# Patient Record
Sex: FEMALE | Race: WHITE
[De-identification: names, ages, dates, MRNs, and addresses within clinical notes are randomized per-mention and may not be internally consistent; named-entity substitution may affect disease eponyms.]

---

## 2023-07-26 ENCOUNTER — HOSPITAL ENCOUNTER (INPATIENT)
Dept: HOSPITAL 97 - ER | Age: 73
LOS: 2 days | Discharge: HOME | DRG: 287 | End: 2023-07-28
Attending: HOSPITALIST | Admitting: HOSPITALIST
Payer: MEDICARE

## 2023-07-26 VITALS — BODY MASS INDEX: 39.6 KG/M2

## 2023-07-26 DIAGNOSIS — Z79.899: ICD-10-CM

## 2023-07-26 DIAGNOSIS — I48.19: Primary | ICD-10-CM

## 2023-07-26 DIAGNOSIS — E87.6: ICD-10-CM

## 2023-07-26 DIAGNOSIS — Z85.3: ICD-10-CM

## 2023-07-26 DIAGNOSIS — I24.8: ICD-10-CM

## 2023-07-26 DIAGNOSIS — Z88.5: ICD-10-CM

## 2023-07-26 DIAGNOSIS — E66.9: ICD-10-CM

## 2023-07-26 LAB
ALBUMIN SERPL BCP-MCNC: 3.8 G/DL (ref 3.4–5)
ALP SERPL-CCNC: 50 U/L (ref 45–117)
ALT SERPL W P-5'-P-CCNC: 42 U/L (ref 13–56)
AST SERPL W P-5'-P-CCNC: 19 U/L (ref 15–37)
BILIRUB INDIRECT SERPL-MCNC: 0.4 MG/DL (ref 0.2–0.8)
BUN BLD-MCNC: 19 MG/DL (ref 7–18)
GLUCOSE SERPLBLD-MCNC: 124 MG/DL (ref 74–106)
HCT VFR BLD CALC: 44.4 % (ref 36–45)
INR BLD: 1.03
LYMPHOCYTES # SPEC AUTO: 2.9 K/UL (ref 0.7–4.9)
MAGNESIUM SERPL-MCNC: 2.6 MG/DL (ref 1.6–2.4)
MCV RBC: 94.6 FL (ref 80–100)
PMV BLD: 7 FL (ref 7.6–11.3)
POTASSIUM SERPL-SCNC: 3.1 MEQ/L (ref 3.5–5.1)
RBC # BLD: 4.69 M/UL (ref 3.86–4.86)
TROPONIN I SERPL HS-MCNC: 538.1 PG/ML (ref ?–58.9)
TSH SERPL DL<=0.05 MIU/L-ACNC: 2.68 UIU/ML (ref 0.36–3.74)

## 2023-07-26 PROCEDURE — 70450 CT HEAD/BRAIN W/O DYE: CPT

## 2023-07-26 PROCEDURE — 85610 PROTHROMBIN TIME: CPT

## 2023-07-26 PROCEDURE — 70551 MRI BRAIN STEM W/O DYE: CPT

## 2023-07-26 PROCEDURE — 80076 HEPATIC FUNCTION PANEL: CPT

## 2023-07-26 PROCEDURE — 76937 US GUIDE VASCULAR ACCESS: CPT

## 2023-07-26 PROCEDURE — 82947 ASSAY GLUCOSE BLOOD QUANT: CPT

## 2023-07-26 PROCEDURE — 84484 ASSAY OF TROPONIN QUANT: CPT

## 2023-07-26 PROCEDURE — 84146 ASSAY OF PROLACTIN: CPT

## 2023-07-26 PROCEDURE — 99284 EMERGENCY DEPT VISIT MOD MDM: CPT

## 2023-07-26 PROCEDURE — 36415 COLL VENOUS BLD VENIPUNCTURE: CPT

## 2023-07-26 PROCEDURE — 80048 BASIC METABOLIC PNL TOTAL CA: CPT

## 2023-07-26 PROCEDURE — 84100 ASSAY OF PHOSPHORUS: CPT

## 2023-07-26 PROCEDURE — 71045 X-RAY EXAM CHEST 1 VIEW: CPT

## 2023-07-26 PROCEDURE — 93005 ELECTROCARDIOGRAM TRACING: CPT

## 2023-07-26 PROCEDURE — 81003 URINALYSIS AUTO W/O SCOPE: CPT

## 2023-07-26 PROCEDURE — 83880 ASSAY OF NATRIURETIC PEPTIDE: CPT

## 2023-07-26 PROCEDURE — 84443 ASSAY THYROID STIM HORMONE: CPT

## 2023-07-26 PROCEDURE — 83001 ASSAY OF GONADOTROPIN (FSH): CPT

## 2023-07-26 PROCEDURE — 83735 ASSAY OF MAGNESIUM: CPT

## 2023-07-26 PROCEDURE — 85025 COMPLETE CBC W/AUTO DIFF WBC: CPT

## 2023-07-26 PROCEDURE — 96375 TX/PRO/DX INJ NEW DRUG ADDON: CPT

## 2023-07-26 PROCEDURE — 96374 THER/PROPH/DIAG INJ IV PUSH: CPT

## 2023-07-26 PROCEDURE — 96372 THER/PROPH/DIAG INJ SC/IM: CPT

## 2023-07-26 PROCEDURE — 93458 L HRT ARTERY/VENTRICLE ANGIO: CPT

## 2023-07-26 RX ADMIN — Medication SCH ML: at 22:17

## 2023-07-26 RX ADMIN — METOPROLOL TARTRATE SCH MG: 25 TABLET ORAL at 22:16

## 2023-07-26 NOTE — RAD REPORT
EXAM DESCRIPTION:  CT - Head Brain Wo Cont - 7/26/2023 8:20 pm

 

CLINICAL HISTORY:  Dizziness;Headache

 

COMPARISON:  No comparisons

 

TECHNIQUE:  All CT scans are performed using dose optimization technique as appropriate and may inclu
de automated exposure control or mA/KV adjustment according to patient size.

 

FINDINGS:  No intracranial hemorrhage, hydrocephalus or extra-axial fluid collection.No areas of brai
n edema or evidence of midline shift.

 

The paranasal sinuses and mastoids are clear. The calvarium is intact.

 

IMPRESSION:  No acute intracranial abnormality.

## 2023-07-26 NOTE — RAD REPORT
EXAM DESCRIPTION:  RAD - Chest Single View - 7/26/2023 6:14 pm

 

CLINICAL HISTORY:  PALPITATIONS

Chest pain.

 

COMPARISON:  No comparisons

 

FINDINGS:  Portable technique limits examination quality.

 

Mild interstitial prominence. The heart is normal in size. No displaced fractures.

 

IMPRESSION:  Mild CHF is possible.

## 2023-07-26 NOTE — XMS REPORT
Clinical Summary

                            Created on:2023



Patient:Edgard Yost

Sex:Female

:1950

External Reference #:[id number]





Demographics







                          Address                   [address] [place] [address]

 

                          Home Phone                [phone]

 

                          Mobile Phone              [phone]

 

                          Email Address             [email or web address]

 

                          Preferred Language        English

 

                          Marital Status            

 

                          Buddhist Affiliation     Unknown

 

                          Race                      White

 

                          Ethnic Group              Not  or 









Author







                          Organization              [facility]

[facility]

 

                          Address                   [address] [place] [address]









Support







                Name            Relationship    Address         Phone

 

                Chan Yost Unavailable     Unavailable     [phone]









Care Team Providers







                    Name                Role                Phone

 

                    Myrtle Barney MD   Primary Care Provider [phone]

 

                    Alessio Mercedes MD Unavailable         [phone]

1









Allergies







             Active Allergy Reactions    Severity     Noted Date   Comments

 

             Codeine      Other (See Comments)              2020   Halluci

nations







Medications







          Medication Sig       Dispensed Refills   Start Date End Date  Status

 

          aspirin 81 mg chewable Chew 81 mg           0                         

    Active



          tablet    daily.                                            

 

          hydroCHLOROthiazide Take 1 tablet           0         2019      

     Active



          (HYDRODIURIL) 25 mg tablet by mouth                                   

       



                    daily.                                            

 

          potassium citrate Take by             0                             Ac

tive



          (UROCIT-K) 10 mEq SR tablet mouth.                                    

        

 

          d-mannose powd Take 1,000 mg           0                             A

ctive



                    by mouth.                                         

 

          cholecalciferol, vitamin Take 5,000           0                       

      Active



          D3, (VITAMIN D3) 5,000 Units by                                       

   



          units tab tablet mouth daily.                                         







Active Problems







                          Problem                   Noted Date

 

                          Osteopenia                10/09/2020

 

                          Primary hyperparathyroidism 2020









                                        Overview: Formatting of this note might 

be different from the original.



                                        Added automatically from request for minesh

jules [id number]









                          Hypertension              2020

 

                          Personal history of breast cancer 2020

 

                          Hyperparathyroidism       2020







Surgical History







                Surgery         Date            Site/Laterality Comments

 

                [place]              3/11/2020       Neck/N/A        Procedure: PARAT

HYROIDECTOMY OR



                PARATHYROIDECTOMY/EXPLORATI                                 EXPL

ORATION OF PARATHYROID(S) -



                ON PARATHYROIDS                                 minimally invasi

ve



                                                                parathyroidectom

y.Removal of



                                                                Left sided infer

ior Type D



                                                                Parathyroid glan

xiomara; Surgeon:



                                                                ROBERT Rod; Location:



                                                                [facility]; Service

: SURG ONC -



                                                                ENDOCRINE







Family History







                Medical History Relation        Name            Comments

 

                Parkinsonism    Brother                         

 

                Acute myelogenous leukemia Father                          

 

                Myelodysplastic syndrome Father                          progres

sed to AML

 

                Breast cancer   Maternal Cousin 2                 

 

                Melanoma        Maternal Cousin 3                 metastatic

 

                Colon cancer    Maternal Grandmother                 

 

                -Unknown cancer Paternal Aunt 1                 abdominal cancer

 

                Breast cancer   Paternal Cousin                 









                Relation        Name            Status          Comments

 

                Brother                         Alive           No history of ca

ncer

 

                Daughter 1                      Alive           

 

                Daughter 2                      Alive           

 

                Father                           (Age 75) 

 

                Maternal Aunt 1 n=3                     No history of ca

ncer

 

                Maternal Aunt 2                 Alive           No history of ca

ncer

 

                Maternal Cousin 1 n=all mat 1st   Alive           



                                cousins                         

 

                Maternal Cousin 2                 Alive           

 

                Maternal Cousin 3                  (Age 20-29) 

 

                Maternal Grandfather                  (Age 80-89) 

 

                Maternal Grandmother                  (Age 75-79) d: col

on cancer

 

                Maternal Uncle  n=3                     No history of ca

ncer

 

                Mother                          Alive           no history of ca

ncer.CHAYO



                                                                BSO due to endom

etriosis



                                                                late 40's

 

                Niece/Nephew    n=2             Alive           No cancer

 

                Paternal Aunt 1                 Alive           

 

                Paternal Aunt 2                 Alive           No history of ca

ncer

 

                Paternal Cousin                 Alive           

 

                Paternal Grandfather                         No history 

of cancer

 

                Paternal Grandmother                  (Age 94) No histor

y of cancer

 

                Paternal Uncle 1                         No history of c

ancer

 

                Paternal Uncle 2                         No history of c

ancer

 

                Paternal Uncle 3                         No history of c

ancer







Social History







             Tobacco Use  Types        Packs/Day    Years Used   Date

 

             Smoking Tobacco: Never Assessed                                    

    









                          Sex Assigned at Birth     Date Recorded

 

                          Not on file               









                    Job Start Date      Occupation          Industry

 

                    Not on file         Not on file         Not on file







Obstetrics History





Last Filed Vital Signs

Not on file



Plan of Treatment







                Health Maintenance Due Date        Last Done       Comments

 

                COVID-19 Vaccination (3 - Moderna series) 2021, 2021 







Results

Not on fileafter 2022



Insurance







          Payer     Benefit Plan / Subscriber ID Effective Phone     Address   T

ype



                    Group               Dates                         

 

          MEDICARE  MEDICARE  [id number]    2021-Prese [phone] [address]    Medic

are



           ADVANTAGE  ADVANTAGE             nt         [id number]        [id number]



                                        



          GENERIC   GENERIC                                 [place] 



                                                            [id number]     









           Guarantor Name Account Type Relation to Date of    Phone      Billing

 Address



                                 Patient    Birth                 

 

           Edgard Yost Personal/Famil Self       1950 [phone] [address]







                      y                                (Home)     [place]



                                                                  [id number]

 

           Edgard Yost Personal/Famil Self       1950 [phone] [address]







                      y                                (Home)     [place]



                                                                  [address]

 

           Edgard Yost Personal/Famil Self       1950 [phone] [address]







                      y                                (Home)     [place]



                                                                  [address]







Advance Directives







                Code Status     Date Activated  Date Inactivated Comments

 

                Full Code       3/11/2020 1:30 PM 3/11/2020 9:12 PM 







Care Teams







             Team Member  Relationship Specialty    Start Date   End Date

 

                                        Myrtle Barney MD



                PCP - General   Endocrine Surgery [address]         



                                        [address] [place] [address]



                                                                



                                        [phone] (Work)



                                                                



             [phone] (Fax)                                        

 

             Alessio Mercedes, PCP - External Referring Internal Medicine 

[address]      



                                        MD



                                                                



                                        [address] [place] [address]



                                                                



                                        [phone] (Work)



                                                                



             [phone] (Fax)

## 2023-07-26 NOTE — ER
Nurse's Notes                                                                                     

 [facility]                                                                 

Name: Edgard Yost                                                                              

Age: 73 yrs                                                                                       

Sex: Female                                                                                       

: 1950                                                                                   

MRN: [id number]                                                                                   

Arrival Date: 2023                                                                          

Time: 17:23                                                                                       

Account#: [id number]                                                                            

Bed 3                                                                                             

Private MD:                                                                                       

Diagnosis: Persistent atrial fibrillation-WITH RVR, NEW ONSET;Palpitations;Hypokalemia;Obesity,   

  unspecified;Non ST elevation MI-ELEVATED TROPONIN                                               

                                                                                                  

Presentation:                                                                                     

                                                                                             

17:35 Chief complaint: Patient states: currently taking Clindamycin post root canal and       aa5 

      states "my heart rate has been over 140 for hours today". Pt also reports heart             

      palpitations and dizziness. Coronavirus screen: At this time, the client does not           

      indicate any symptoms associated with coronavirus-19. Ebola Screen: Patient denies          

      travel to an Ebola-affected area in the 21 days before illness onset. Initial Sepsis        

      Screen: Does the patient meet any 2 criteria? HR > 90 bpm. Does the patient have a          

      suspected source of infection? Yes:. Risk Assessment: Do you want to hurt yourself or       

      someone else? Patient reports no desire to harm self or others. Onset of symptoms was       

      2023.                                                                              

17:35 Acuity: LEYLA 2                                                                           aa5 

17:35 Method Of Arrival: Wheelchair                                                           aa5 

                                                                                                  

Historical:                                                                                       

- Allergies:                                                                                      

17:32 Codeine;                                                                                aa5 

- Home Meds:                                                                                      

17:32 Hydrochlorothiazide Oral [Active];                                                      aa5 

- PMHx:                                                                                           

17:32 Kidney stone; Breast Cancer; Muscle sarcoma;                                            aa5 

- PSHx:                                                                                           

17:32 back;  section;                                                                 aa5 

                                                                                                  

- Immunization history:: Adult Immunizations unknown.                                             

- Social history:: Smoking status: Patient denies any tobacco usage or history of.                

- Family history:: not pertinent.                                                                 

                                                                                                  

                                                                                                  

Screenin:15 [facility] ED Fall Risk Assessment (Adult) History of falling in the last 3 months,       ko1 

      including since admission No falls in past 3 months (0 pts) Confusion or Disorientation     

      No (0 pts) Intoxicated or Sedated No (0 pts) Impaired Gait No (0 pts) Mobility Assist       

      Device Used No (0 pt) Altered Elimination No (0 pt) Score/Fall Risk Level 0 - 2 = Low       

      Risk Oriented to surroundings, Maintained a safe environment, Educated pt \T\ family on     

      fall prevention, incl call for assistance when getting out of bed, Assessed \T\             

      reinforced patient's understanding of fall precautions, Provided non-skid footwear,         

      Hourly rounding (assess needs \T\ fall precautionary measures) done, Used ambulatory aids   

      as needed (educated on \T\ assisted with), Used gait belt as appropriate. Abuse screen:     

      Denies threats or abuse. Denies injuries from another. Nutritional screening: No            

      deficits noted. Tuberculosis screening: No symptoms or risk factors identified.             

                                                                                                  

Assessment:                                                                                       

18:00 General: Appears in no apparent distress. uncomfortable, Behavior is calm, cooperative, ko1 

      appropriate for age. Pain: Denies pain. Neuro: No deficits noted. Cardiovascular:           

      Reports lightheadedness, palpitations, Patient's skin is warm and dry. Pulses are all       

      present. Rhythm is atrial fibrillation with rapid ventricular response. Respiratory: No     

      deficits noted. GI: No deficits noted. : No deficits noted. EENT: No deficits noted.      

      Derm: No deficits noted. Musculoskeletal: No deficits noted.                                

                                                                                                  

Vital Signs:                                                                                      

17:35  / 81; Pulse 120; Resp 18 S; Temp 98.4(TE); Pulse Ox 98% on R/A; Weight 108.86 kg aa5 

      (R); Height 5 ft. 7 in. (R);                                                                

18:15 BP 99 / 77; Pulse 149; Resp 18; Pulse Ox 95% ;                                          ko1 

18:20  / 76; Pulse 103; Resp 16; Pulse Ox 97% ;                                         ko1 

18:27  / 64; Pulse 111; Resp 18; Pulse Ox 95% ;                                         ko1 

18:30  / 76; Pulse 108; Resp 16; Pulse Ox 94% ;                                         ko1 

17:35 Body Mass Index 37.59 (108.86 kg, 170.18 cm)                                            aa5 

                                                                                                  

ED Course:                                                                                        

17:26 Patient arrived in ED.                                                                  im  

17:32 Arm band placed on.                                                                     aa5 

17:37 Triage completed.                                                                       aa5 

17:40 Jensen Ruiz MD is Attending Physician.                                             [facility] 

17:40 Patient has correct armband on for positive identification. Placed in gown. Bed in low  aa5 

      position. Call light in reach. Side rails up X2. Client placed on continuous cardiac        

      and pulse oximetry monitoring. NIBP monitoring applied.                                     

17:48 EKG done, by ED staff, reviewed by Jensen Ruiz MD.                                   aa5 

17:56 Martha Gan, RN is Primary Nurse.                                                     ko1 

18:15 Provided Education on: Afib/meds/labs. Door closed. Noise minimized.                    ko1 

18:15 Inserted saline lock: 20 gauge in left antecubital area, using aseptic technique. Blood ko1 

      collected.                                                                                  

18:16 XRAY Chest (1 view) In Process Unspecified.                                             ED[facility]

18:25 CBC with Diff Sent.                                                                     ko1 

18:25 LFT's Sent.                                                                             ko1 

18:25 Magnesium Sent.                                                                         ko1 

18:25 NT PRO-BNP Sent.                                                                        ko1 

18:25 PT-INR Sent.                                                                            ko1 

18:25 Troponin HS Sent.                                                                       ko1 

18:25 Basic Metabolic Panel Sent.                                                             ko1 

18:25 TSH Sent.                                                                               ko1 

18:25 Urinalysis w/ reflexes Sent.                                                            ko1 

18:49 Cindy Hanna MD is Hospitalizing Provider.                                           jeny 

                                                                                                  

Administered Medications:                                                                         

18:24 Drug: Metoprolol PO 50 mg Route: PO;                                                    ko1 

18:24 Drug: NS 0.9% IV 1000 ml Route: IV; Rate: 1 bolus; Site: left antecubital;              ko1 

18:24 Drug: Magnesium Sulfate IVPB 1 grams Route: IVPB; Infused Over: 1 hrs; Site: left       ko1 

      antecubital;                                                                                

18:25 Drug: Metoprolol IVP 5 mg Route: IVP; Site: left antecubital;                           ko1 

18:47 Drug: Famotidine IVP 20 mg Route: IVP; Site: left antecubital;                          ko1 

18:47 Drug: Rocephin IV 1 grams Route: IV; Rate: per protocol; Site: left antecubital;        ko1 

18:56 Drug: Metoprolol IVP 5 mg Route: IVP; Site: left antecubital;                           ko1 

19:09 Drug: Acetaminophen PO 1000 mg Route: PO;                                               ko1 

19:14 Drug: Metoprolol IVP 5 mg Route: IVP; Site: left antecubital;                           ko1 

19:14 Drug: Enoxaparin Sub-Q 1 mg/kg Route: Sub-Q; Site: abdomen;                             ko1 

19:20 Drug: Aspirin PO Chewable Tablet 324 mg Route: PO;                                      as6 

20:02 Drug: Potassium PO Effervescent Tablet 50 mEq Route: PO;                                jb4 

                                                                                                  

                                                                                                  

Outcome:                                                                                          

18:50 Decision to Hospitalize by Provider.                                                    [facility] 

20:23 Patient left the ED.                                                                    jb4 

                                                                                                  

Signatures:                                                                                       

Dispatcher [employer]                           EDJensen Gunn, MD                     MD   jeny                                                  

Silverman, Nury, RN                     RN   aa5                                                  

Jeb Joshi, RN                       RN   jb4                                                  

Kt Mahan, DANIKA                      RN   as6                                                  

Martha Gan, DANIKA                       RN   ko1                                                  

Celi Melendez                                                   

                                                                                                  

Corrections: (The following items were deleted from the chart)                                    

17:37 17:35  / 81; Pulse 73bpm; Resp 18bpm; Spontaneous; Pulse Ox 98% RA; Temp 98.4F    aa5 

      Temporal; 108.86 kg Reported; Height 5 ft. 7 in. Reported; BMI: 37.5; aa5                   

                                                                                                  

**************************************************************************************************

## 2023-07-26 NOTE — EDPHYS
Physician Documentation                                                                           

 [facility]                                                                 

Name: Edgard Yost                                                                              

Age: 73 yrs                                                                                       

Sex: Female                                                                                       

: 1950                                                                                   

MRN: [id number]                                                                                   

Arrival Date: 2023                                                                          

Time: 17:23                                                                                       

Account#: [id number]                                                                            

Bed 3                                                                                             

Private MD:                                                                                       

ED Physician Jensen Ruiz                                                                      

HPI:                                                                                              

                                                                                             

18:44 This 73 yrs old  Female presents to ER via Wheelchair with complaints of heart jeny 

      rate issues.                                                                                

18:44 The patient presents with a history of irregular heart beat, heart racing. Context: The jeny 

      symptoms occur at rest. Onset: The symptoms/episode began/occurred just prior to            

      arrival, this morning. Duration: The patient or guardian reports a single episode, that     

      is still ongoing. Modifying factors: The symptoms are aggravated by nothing. The            

      symptoms are alleviated by nothing. FAST HR, NO HX. Associated signs and symptoms:          

      Pertinent positives: lightheadedness. Severity of symptoms: At their worst the symptoms     

      were mild moderate in the emergency department the symptoms are unchanged. The patient      

      has not experienced similar symptoms in the past.                                           

                                                                                                  

Historical:                                                                                       

- Allergies:                                                                                      

17:32 Codeine;                                                                                aa5 

- Home Meds:                                                                                      

17:32 Hydrochlorothiazide Oral [Active];                                                      aa5 

- PMHx:                                                                                           

17:32 Kidney stone; Breast Cancer; Muscle sarcoma;                                            aa5 

- PSHx:                                                                                           

17:32 back;  section;                                                                 aa5 

                                                                                                  

- Immunization history:: Adult Immunizations unknown.                                             

- Social history:: Smoking status: Patient denies any tobacco usage or history of.                

- Family history:: not pertinent.                                                                 

                                                                                                  

                                                                                                  

ROS:                                                                                              

18:44 Constitutional: Negative for fever, chills, and weight loss, Eyes: Negative for injury, jeny 

      pain, redness, and discharge, ENT: Negative for injury, pain, and discharge, Neck:          

      Negative for injury, pain, and swelling, Respiratory: Negative for shortness of breath,     

      cough, wheezing, and pleuritic chest pain, Abdomen/GI: Negative for abdominal pain,         

      nausea, vomiting, diarrhea, and constipation, Back: Negative for injury and pain, :       

      Negative for injury, bleeding, discharge, and swelling, MS/Extremity: Negative for          

      injury and deformity, Skin: Negative for injury, rash, and discoloration, Neuro:            

      Negative for headache, weakness, numbness, tingling, and seizure, Psych: Negative for       

      depression, anxiety, suicide ideation, homicidal ideation, and hallucinations,              

      Allergy/Immunology: Negative for hives, rash, and allergies, Endocrine: Negative for        

      neck swelling, polydipsia, polyuria, polyphagia, and marked weight changes,                 

      Hematologic/Lymphatic: Negative for swollen nodes, abnormal bleeding, and unusual           

      bruising.                                                                                   

18:44 Cardiovascular: Positive for palpitations.                                                  

                                                                                                  

Exam:                                                                                             

18:44 Constitutional:  This is a well developed, well nourished patient who is awake, alert,  jeny 

      and in no acute distress. Head/Face:  Normocephalic, atraumatic. Eyes:  Pupils equal        

      round and reactive to light, extra-ocular motions intact.  Lids and lashes normal.          

      Conjunctiva and sclera are non-icteric and not injected.  Cornea within normal limits.      

      Periorbital areas with no swelling, redness, or edema. ENT:  Nares patent. No nasal         

      discharge, no septal abnormalities noted.  Tympanic membranes are normal and external       

      auditory canals are clear.  Oropharynx with no redness, swelling, or masses, exudates,      

      or evidence of obstruction, uvula midline.  Mucous membranes moist. Neck:  Trachea          

      midline, no thyromegaly or masses palpated, and no cervical lymphadenopathy.  Supple,       

      full range of motion without nuchal rigidity, or vertebral point tenderness.  No            

      Meningismus. Chest/axilla:  Normal chest wall appearance and motion.  Nontender with no     

      deformity.  No lesions are appreciated. Respiratory:  Lungs have equal breath sounds        

      bilaterally, clear to auscultation and percussion.  No rales, rhonchi or wheezes noted.     

       No increased work of breathing, no retractions or nasal flaring. Abdomen/GI:  Soft,        

      non-tender, with normal bowel sounds.  No distension or tympany.  No guarding or            

      rebound.  No evidence of tenderness throughout. Back:  No spinal tenderness.  No            

      costovertebral tenderness.  Full range of motion. Female :  Normal external               

      genitalia. Skin:  Warm, dry with normal turgor.  Normal color with no rashes, no            

      lesions, and no evidence of cellulitis. MS/ Extremity:  Pulses equal, no cyanosis.          

      Neurovascular intact.  Full, normal range of motion. Neuro:  Awake and alert, GCS 15,       

      oriented to person, place, time, and situation.  Cranial nerves II-XII grossly intact.      

      Motor strength 5/5 in all extremities.  Sensory grossly intact.  Cerebellar exam            

      normal.  Normal gait. Psych:  Awake, alert, with orientation to person, place and time.     

       Behavior, mood, and affect are within normal limits.                                       

18:44 Cardiovascular: Rate: tachycardic, actual rate is  108 bpm, Rhythm: irregularly             

      irregular, Pulses: Pulses are 4+ in bilateral radial, brachial, femoral, popliteal,         

      posterior tibial and and dorsalis pedis arteries.. Heart sounds: normal, Edema: is not      

      appreciated, JVD: is not appreciated.                                                       

18:44 ECG was reviewed by the Attending Physician.                                                

                                                                                                  

Vital Signs:                                                                                      

17:35  / 81; Pulse 120; Resp 18 S; Temp 98.4(TE); Pulse Ox 98% on R/A; Weight 108.86 kg aa5 

      (R); Height 5 ft. 7 in. (R);                                                                

18:15 BP 99 / 77; Pulse 149; Resp 18; Pulse Ox 95% ;                                          ko1 

18:20  / 76; Pulse 103; Resp 16; Pulse Ox 97% ;                                         ko1 

18:27  / 64; Pulse 111; Resp 18; Pulse Ox 95% ;                                         ko1 

18:30  / 76; Pulse 108; Resp 16; Pulse Ox 94% ;                                         ko1 

17:35 Body Mass Index 37.59 (108.86 kg, 170.18 cm)                                            aa5 

                                                                                                  

MDM:                                                                                              

17:40 Patient medically screened.                                                             jeny 

18:47 Differential diagnosis: arrythmia, dehydration. Data reviewed: vital signs, nurses      [facility] 

      notes, lab test result(s), EKG, radiologic studies, plain films. Consideration of           

      Admission/Observation Patient was admitted/placed on observation. Escalation of care        

      including admission/observation considered. I considered the following discharge            

      prescriptions or medication management in the emergency department Medications were         

      administered in the Emergency Department. See MAR. Independent interpretation of the        

      following test(s) in the Emergency Department EKG: See my EKG interpretation above.         

      Test considered but Not performed: CT: NO CT CHEST R/O PE. Historians other than the        

      Patient: Spouse/Significant Other:  WELL INFORMED. Care significantly affected       

      by the following chronic conditions: BREAST CA, SARCOMA, KIDNEY STONES. Counseling: I       

      had a detailed discussion with the patient and/or guardian regarding: the historical        

      points, exam findings, and any diagnostic results supporting the discharge/admit            

      diagnosis, lab results, radiology results, the need for further work-up and treatment       

      in the hospital.                                                                            

                                                                                                  

                                                                                             

17:41 Order name: Basic Metabolic Panel; Complete Time: 19:28                                 jeny 

07/26                                                                                             

17:41 Order name: CBC with Diff; Complete Time: 18:39                                                                                                                                      

17:41 Order name: LFT's; Complete Time: 19:28                                                 jeny 

                                                                                             

17:41 Order name: Magnesium; Complete Time: 19:28                                                                                                                                          

17:41 Order name: NT PRO-BNP; Complete Time: 19:28                                                                                                                                         

17:41 Order name: PT-INR; Complete Time: 18:39                                                                                                                                             

17:41 Order name: Troponin HS; Complete Time: 19:28                                                                                                                                        

17:41 Order name: TSH; Complete Time: 19:28                                                                                                                                                

17:41 Order name: Urinalysis w/ reflexes; Complete Time: 18:39                                [facility] 

                                                                                             

19:15 Order name: Basic Metabolic Panel                                                       EDMS

                                                                                             

19:15 Order name: Basic Metabolic Panel                                                       EDMS

                                                                                             

19:15 Order name: Basic Metabolic Panel                                                       EDMS

                                                                                             

19:15 Order name: NT PRO-BNP                                                                  EDMS

                                                                                             

19:15 Order name: Urinalysis w/ reflexes                                                      EDMS

                                                                                             

19:15 Order name: Basic Metabolic Panel                                                       EDMS

                                                                                             

19:15 Order name: CBC with Automated Diff                                                     EDMS

                                                                                             

19:15 Order name: CBC with Automated Diff                                                     EDMS

                                                                                             

19:15 Order name: CBC with Automated Diff                                                     EDMS

                                                                                             

19:15 Order name: CBC with Automated Diff                                                     EDMS

                                                                                             

17:41 Order name: XRAY Chest (1 view); Complete Time: 18:39                                                                                                                                

19:15 Order name: Echo without Doppler (2D)                                                   [facility]

                                                                                             

19:38 Order name: CT Head Brain wo Cont                                                       jb4 

                                                                                             

17:41 Order name: EKG; Complete Time: 17:42                                                                                                                                                

17:41 Order name: Cardiac monitoring; Complete Time: 17:49                                                                                                                                 

17:41 Order name: EKG - Nurse/Tech; Complete Time: 17:49                                                                                                                                   

17:41 Order name: IV Saline Lock; Complete Time: 18:25                                                                                                                                     

17:41 Order name: Labs collected and sent; Complete Time: 17:49                                                                                                                            

17:41 Order name: O2 Per Protocol; Complete Time: 17:49                                                                                                                                    

17:41 Order name: O2 Sat Monitoring; Complete Time: 17:49                                     jeny 

                                                                                                  

EC:44 Rate is 153 beats/min. Rhythm is irregularly irregular. QRS Axis is Normal. MA interval jeny 

      is normal. QRS interval is normal. QT interval is normal. No Q waves. T waves are           

      Normal. No ST changes noted. Clinical impression: NSR w/ Non-specific ST/T Changes and      

      No evidence of ischemia. Interpreted by me. Reviewed by me.                                 

                                                                                                  

Administered Medications:                                                                         

18:24 Drug: Metoprolol PO 50 mg Route: PO;                                                    ko1 

18:24 Drug: NS 0.9% IV 1000 ml Route: IV; Rate: 1 bolus; Site: left antecubital;              ko1 

18:24 Drug: Magnesium Sulfate IVPB 1 grams Route: IVPB; Infused Over: 1 hrs; Site: left       ko1 

      antecubital;                                                                                

18:25 Drug: Metoprolol IVP 5 mg Route: IVP; Site: left antecubital;                           ko1 

18:47 Drug: Famotidine IVP 20 mg Route: IVP; Site: left antecubital;                          ko1 

18:47 Drug: Rocephin IV 1 grams Route: IV; Rate: per protocol; Site: left antecubital;        ko1 

18:56 Drug: Metoprolol IVP 5 mg Route: IVP; Site: left antecubital;                           ko1 

19:09 Drug: Acetaminophen PO 1000 mg Route: PO;                                               ko1 

19:14 Drug: Metoprolol IVP 5 mg Route: IVP; Site: left antecubital;                           ko1 

19:14 Drug: Enoxaparin Sub-Q 1 mg/kg Route: Sub-Q; Site: abdomen;                             ko1 

19:20 Drug: Aspirin PO Chewable Tablet 324 mg Route: PO;                                      as6 

20:02 Drug: Potassium PO Effervescent Tablet 50 mEq Route: PO;                                jb4 

                                                                                                  

                                                                                                  

Disposition Summary:                                                                              

23 18:50                                                                                    

Hospitalization Ordered                                                                           

      Hospitalization Status: Inpatient Admission                                             jeny 

      Provider: Cindy Hanna cha 

      Location: Telemetry/MedSurg (Inpatient)                                                 jeny 

      Condition: Stable                                                                       jeny 

      Problem: new                                                                            jeny 

      Symptoms: have improved                                                                 jeny 

      Bed/Room Type: Standard                                                                 jeny 

      Room Assignment: 222(23 19:33)                                                    eb1 

      Diagnosis                                                                                   

        - Persistent atrial fibrillation - WITH RVR, NEW ONSET                                jeny 

        - Palpitations                                                                        jeny 

        - Hypokalemia                                                                         jeny 

        - Obesity, unspecified                                                                jeny 

        - Non ST elevation MI - ELEVATED TROPONIN                                             jeny 

      Forms:                                                                                      

        - Medication Reconciliation Form                                                      jeny 

        - SBAR form                                                                           jeny 

Signatures:                                                                                       

Dispatcher [employer]                           EDMS                                                 

Jensen Ruiz MD MD cha Waters, Shelly, PALAK-C                   FNP-Florw                                                  

Nury Silverman, RN                     RN   aa5                                                  

Jeb Joshi, RN                       RN   jb4                                                  

Loraine Norris, RN                     RN   eb1                                                  

Kt Mahan, RN                      RN   as6                                                  

Martha Gan RN                       RN   ko1                                                  

                                                                                                  

Corrections: (The following items were deleted from the chart)                                    

19:17 19:15 Magnesium ordered. EDMS                                                           EDMS

19:18 19:15 Renal ordered. EDMS                                                               EDMS

19:19 19:15 CONS Physician Consult ordered. EDMS                                              EDMS

19:33 18:50 jeny                                                                               eb1 

                                                                                                  

**************************************************************************************************

## 2023-07-26 NOTE — XMS REPORT
Continuity of Care Document

                            Created on:2023



Patient:ROLANDO YOST

Sex:Female

:1950

External Reference #:[id number]





Demographics







                          Address                   [address]



                                                    [place] [address]

 

                          Home Phone                [phone]

 

                          Work Phone                [phone]

 

                          Mobile Phone              [phone]

 

                          Email Address             [email or web address]

 

                          Preferred Language        English

 

                          Marital Status            Unknown

 

                          Islam Affiliation     Unknown

 

                          Race                      Unknown

 

                          Additional Race(s)        White

 

                          Ethnic Group              Unknown









Author







                          Organization              [facility]

t

 

                          Address                   [address]



                                                    [place] [address]

 

                          Phone                     [phone]









Support







                Name            Relationship    Address         Phone

 

                Siomara Yost    Spouse          Unavailable     [phone]

 

                Chan Yost Spouse          Unavailable     [phone]

 

                Chan Hackett Spouse          [address] [phone]



                                                [place] [address] 









Care Team Providers







                    Name                Role                Phone

 

                    [id number]               Primary Care Physician Unavailable

 

                    SYSTEM, PROVIDER NOT IN Attending Clinician Unavailable

 

                    Fly LEBLANC, Pranay Maldonado Attending Clinician [phone]

 

                    Soheila Hernández     Attending Clinician [phone]

 

                    Elle Lindsay MA     Attending Clinician Unavailable

 

                    Timothy Muñoz       Attending Clinician [phone]

 

                    Nicolle JULES, Carina Mcleod Attending Clinician [phone]

[phone]

 

                    Amaya Denton     Attending Clinician [phone]

 

                    BWilliams           Attending Clinician Unavailable

 

                    OWENS_T             Attending Clinician Unavailable

 

                    SYSTEM, PROVIDER    Attending Clinician Unavailable

 

                    RITA LIM      Attending Clinician Unavailable

 

                    DERRICK ANAND     Attending Clinician Unavailable

 

                    CRISTOFER BROOKS     Attending Clinician Unavailable

 

                    BOGENRIEDER_N       Attending Clinician Unavailable

 

                    JASON HORN      Attending Clinician Unavailable

 

                    SIMA AGUILAR   Attending Clinician Unavailable

 

                    SYSTEM, PROVIDER NOT IN Attending Clinician Unavailable

 

                    BWilliams           Admitting Clinician Unavailable

 

                    OWENS_T             Admitting Clinician Unavailable

 

                    BOGENRIEDER_N       Admitting Clinician Unavailable









Payers







           Payer Name Policy Type Policy Number Effective Date Expiration Date S

[facility]            [id number]     2021            



           (MEDICARE                        00:00:00              



           REPLACEMENT HMO)                                             

 

           MEDICARE ADVANTAGE            [id number]     2021            



           GENERIC                          00:00:00              

 

           MEDICARE PART A AND            [id number] 2013            



           B                                00:00:00              

 

           [employer] [place] [employer]            [id number]  2013            



                                            00:00:00              







Problems







       Condition Condition Condition Status Onset  Resolution Last   Treating Co

mments 

Source



       Name   Details Category        Date   Date   Treatment Clinician        



                                                 Date                 

 

       Breast Breast Disease Active 2021                             Methodi



       cancer cancer               0                               st



                                   00:00:                             [facility]osp[facility]



                                   00                                 l

 

       Sarcoma Sarcoma Disease Active 2021                             Methodi



                                   0                               st



                                   00:00:                             [facility]ospita



                                   00                                 l

 

       Hyperparat Hyperparat Disease Active 2021                             M

ethodi



       hyroidism hyroidism               



                                   00:00:                             [facility]osp[facility]



                                   00                                 l

 

       Renal  Renal  Disease Active 2021                             Methodi



       lithiasis lithiasis               



                                   00:00:                             [facility]osp[facility]



                                   00                                 l

 

       Cholelithi Cholelithi Disease Active 2021                             M

ethodi



       asis   asis                 



                                   00:00:                             [facility]osp[facility]



                                   00                                 l

 

       Osteopenia Osteopenia Disease Active 2020                             U

nivers



                                   009                               ity of



                                   00:00:                             [place]



                                   00                                 MD [facility]



                                                                      Cancer



                                                                      Center

 

       Primary Primary Disease Active                       Overview: Univ

ers



       hyperparat hyperparat                                       Formattin

 ity of



       hyroidism hyroidism               00:00:                      g of this T

exas



                                   00                          note   MD



                                                               might be Anderso



                                                               different n



                                                               from the Cancer



                                                               original. Center



                                                               Added  



                                                               automatic 



                                                               ally from 



                                                               request 



                                                               for    



                                                               surgery 



                                                               [id number] 

 

       Hypertensi Hypertensi Disease Active -0                             U

nivers



       on     on                                                  ity of



                                   00:00:                             [place]



                                   00                                 MD [facility]



                                                                      Cancer



                                                                      Center

 

       Personal Personal Disease Active                              Unive

rs



       history of history of                                              it

y of



       breast breast               00:00:                             [place]



       cancer cancer               00                                 MD [facility]



                                                                      Cancer



                                                                      Center

 

       Hyperparat Hyperparat Disease Active 0                             U

nivers



       hyroidism hyroidism                                              ity 

of



                                   00:00:                             [place]



                                   00                                 MD Ana mck[facility]



                                                                      Cancer



                                                                      Center

 

       Coronary Coronary Disease Active                             Overview: [facility]

I St



       artery artery                                           [facility]



       disease disease                                           g of this Medic

al



                                                               note   Center



                                                               might be 



                                                               different 



                                                               from the 



                                                               original. 



                                                               exertiona 



                                                               l      



                                                               dsypnea. 



                                                               abnormal 



                                                               nuclear 



                                                               scan 2013 

 

       Obesity Obesity Disease Active                                    [facility]



       (BMI   (BMI                                                    [facility]



       30-39.9) 30-39.9)                                                  Medica

l



                                                                      Center







Allergies, Adverse Reactions, Alerts







       Allergy Allergy Status Severity Reaction(s) Onset  Inactive Treating Comm

ents 

Source



       Name   Type                        Date   Date   Clinician        

 

       Codeine Propensi Active        Other (See 2020-0               [facility] 

Univers



              ty to                Comments)                  tions  ity of



              adverse                      00:00:                      [place]



              reaction                      00                          MD charo Perez



                                                                      n



                                                                      Cancer



                                                                      Center

 

       CODEINE DRUG   Active        Other                        MD BLOOM                                              Ana



                                          00:00:                      n



                                          00                          

 

       Codeine Propensi Active                              [facility] Meth

caterina



              ty to                                        tions  st



              adverse                      00:00:                      [facility]osp[facility]



              reaction                      00                          l



              s to                                                    



              drug                                                    

 

       Codeine Drug   Active        Nausea Only 2013                      [facility]



              Allergy                                              [facility]



                                          00:00:                      [facility]



                                          00                          Center







Family History







           Family Member Diagnosis  Comments   Start Date Stop Date  Source

 

           Maternal aunt                                             [facility] 

[facility]



                                                                  [place] [facility]



                                                                  Joseph Cance

r



                                                                  [place]

 

           Maternal cousin                                             [facility]

y of



                                                                  [place]



                                                                  Joseph Cance

r



                                                                  [place]

 

           Maternal                                               [facility] [place] MD



                                                                  Joseph Cance

r



                                                                  [place]

 

           Maternal   Colon cancer                                  [place]

f



           grandmother                                             [place] [facility]



                                                                  Joseph Cance

r



                                                                  [place]

 

           Maternal   Cancer                                      [facility]



           [facility]

 

           Maternal uncle                                             [facility]

 [facility]



                                                                  [place]



                                                                  Joseph Cance

r



                                                                  [place]

 

           Natural mother                                             [facility]

 [facility]



                                                                  [place]



                                                                  Joseph Cance

r



                                                                  [place]

 

           Niece/nephew                                             University o

f



                                                                  [place] [facility]



                                                                  Joseph Cance

r



                                                                  [place]

 

           Paternal aunt -Unknown cancer                                  Univer

sity of



                                                                  [place]



                                                                  Joseph Cance

r



                                                                  [place]

 

           Paternal cousin Breast cancer                                  Univer

sity of



                                                                  [place]



                                                                  Joseph Cance

r



                                                                  [place]

 

           Paternal                                               [facility] [place] MD



                                                                  Joseph Cance

r



                                                                  [place]

 

           Paternal                                               [facility] MD



                                                                  Joseph Cance

r



                                                                  [place]

 

           Paternal uncle                                             [facility]

 [facility]



                                                                  [place]



                                                                  Joseph Cance

r



                                                                  [place]

 

           Natural brother Parkinsonism                                  Univers

ity of



                                                                  [place]



                                                                  Joseph Cance

r



                                                                  [place]

 

           Natural daughter                                             Universi

ty of



                                                                  [place]



                                                                  Joseph Cance

r



                                                                  [place]

 

           Natural father Acute myelogenous                                  Uni

versity of



                      leukemia                                    [place] [facility]



                                                                  Joseph Cance

r



                                                                  [place]

 

           Natural father Myelodysplastic                                  Unive

rsity of



                      syndrome                                    [place] [facility]



                                                                  Joseph Cance

r



                                                                  [place]







Social History







           Social Habit Start Date Stop Date  Quantity   Comments   Source

 

           Sexual orientation 2021-10-13            Heterosexual            Meth

odist



                      10:39:40              (finding)             [facility]

 

           Gender identity                                             [facility]

 

           History of tobacco                       Current smoker            [facility]



           use                                                    [facility] [place]

 

           History of Social 2023                       Methodi

st



           function   00:00:00   00:00:00                         Hospital

 

           Alcohol intake 2023 Current drinker of            [facility]

[facility]



                      00:00:00   00:00:00   alcohol (finding)            Medical

 Center

 

           Cigarette  2022                       [facility]



           pack-years 00:00:00   00:00:00                         Hospital

 

           Tobacco use and 2022 Smokeless tobacco            Me

thodist



           exposure   00:00:00   00:00:00   non-user              Hospital

 

           Alcohol Comment 2014 maybe twice a week            C

FRANCESCA Dejesus



                      00:00:00   00:00:00   from the two            Medical Cent

er



                                            options               

 

           Sex Assigned At 1950                       [facility]



           Birth      00:00:00   00:00:00                         [facility]









                Smoking Status  Start Date      Stop Date       Source

 

                Ex-smoker                                       [facility]

 

                Never smoked tobacco                                 [facility]

ospital







Medications







       Ordered Filled Start  Stop   Current Ordering Indication Dosage Frequency

 Signature

                    Comments            Components          Source



     Medication Medication Date Date Medication? Clinician                (SIG) 

          



     Name Name                                                   

 

     potassium            Yes                      Take by           Metho

di



     citrate      7-24                               mouth.           st



     (UROCIT-K)      13:48:                                              Hospita



     10 mEq      14                                                l



     (1,080 mg)                                                        



     CR tablet                                                        

 

     nitrofurant      2022             100mg Q.5D Take 1           Me

thodi



     oin,                                capsule           st



     macrocrysta      00:00: 05:59                          (100 mg           Ho

spita



     l-monohydra      00   :00                           total) by           l



     te,                                          mouth 2           



     (MACROBID)                                         (two)           



     100 MG                                         times a           



     capsule                                         day for 5           



                                                  days.           

 

     aspirin 81      2021      Yes            81mg      Chew 81 mg           U

nivers



     mg chewable      2-10                               daily.           ity of



     tablet      10:50:                                              [place]



               18                                                MD Perez



                                                                 [facility]

 

     potassium      2021      Yes                      Take by           Unive

rs



     citrate      2-10                               mouth.           ity of



     (UROCIT-K)      10:50:                                              [place]



     10 mEq SR      18                                                MD



     tablet                                                        [facility]

 

     d-mannose      2021      Yes            1000mg      Take 1,000           

Univers



     powd      2-10                               mg by           ity of



               10:50:                               mouth.           [place]



               18                                                MD Perez



                                                                 [facility]

 

     cholecalcif      2021      Yes            5000U      Take 5,000          

 Univers



     isaac,      2-10                               Units by           ity of



     vitamin D3,      10:50:                               mouth           [place]



     (VITAMIN      18                                 daily.           MD



     D3) 5,000                                                        [facility]



     units tab                                                        n



     tablet                                                        Cancer



                                                                 Center

 

     hydroCHLORO      2020      Yes            25mg QD   Take 1           Meth

caterina



     thiazide      2-02                               tablet (25           st



     (HYDRODIURI      00:00:                               mg total)           H

ospita



     L) 25 MG      00                                 by mouth           l



     tablet                                         daily.           

 

     hydroCHLORO      2019      Yes            1{tbl}      Take 1           Un

mary ann



     thiazide      2-03                               tablet by           ity of



     (HYDRODIURI      00:00:                               mouth           [place]



     L) 25 mg      00                                 daily.           MD



     tablet                                                        Ana mckenzie



                                                                 Cancer



                                                                 Center

 

     aspirin 81            Yes            81mg QD   Take 81 mg           C

HI St



     MG chewable      1-03                               by mouth           [facility]

s



     tablet      11:47:                               daily.           Medical



               34                                                Center







Vital Signs







             Vital Name   Observation Time Observation Value Comments     Source

 

             Oxygen saturation in 2023 18:44:00 95 /min                   

[facility]



             Arterial blood by                                        



             Pulse oximetry                                        

 

             Systolic blood 2023 18:44:00 144 mm[Hg]                [facility]



             pressure                                            

 

             Diastolic blood 2023 18:44:00 70 mm[Hg]                 [facility]



             pressure                                            

 

             Heart rate   2023 18:44:00 93 /min                   [facility]

 

             Body weight  2023 18:44:00 110.678 kg                [facility]

 

             BMI          2023 18:44:00 38.22 kg/m2               [facility]







Procedures







                Procedure       Date / Time     Performing Clinician Source



                                Performed                       

 

                CANCER ANTIGEN 15-3 2023 19:37:00 [facility]

 

                COMPREHENSIVE METABOLIC 2023 19:37:00 [facility]



                PANEL                                           

 

                CBC WITH PLATELET AND 2023 19:37:00 [facility]



                DIFFERENTIAL                                    

 

                [facility]CH MAMMO BREAST 2023 17:51:00 [facility]



                DIAGNOSTIC TOMOSYNTHESIS                                 



                BILATERAL (FUJI RIS)                                 

 

                URINE CULTURE,  2022 16:13:00 [facility]

spital



                COMPREHENSIVE (DANICA RAMIRES)                                           

 

                URINALYSIS SCREEN AND 2022 16:13:00 [facility]



                MICROSCOPY, WITH REFLEX                 Shani         



                TO CULTURE                                      

 

                MICROSCOPIC EXAMINATION 2022 16:13:00 [facility]         

 

                POC URINALYSIS DIPSTICK 2022 15:19:48 [facility]         

 

                [id number]         2022 15:19:08 Nicolle Carina [facility]



                                                Shani         







Plan of Care







             Planned Activity Planned Date Details      Comments     Source

 

             Future Scheduled 2023   Screening for              [facility]



             Test         14:51:14     malignant neoplasm of              



                                       colon (procedure)              



                                       [code = 155627026]              

 

             Future Scheduled 2023   Screening for              [facility]



             Test         14:51:14     malignant neoplasm of              



                                       colon (procedure)              



                                       [code = 520474546]              

 

             Future Scheduled 2023   Screening for              [facility]



             Test         14:51:14     malignant neoplasm of              



                                       colon (procedure)              



                                       [code = 070182179]              

 

             Future Scheduled 2023   Hepatitis C screening              [facility]



             Test         14:51:14     (procedure) [code =              



                                       970652092]                

 

             Future Scheduled 2023   Screening for              [facility]



             Test         14:51:14     malignant neoplasm of              



                                       colon (procedure)              



                                       [code = 597314646]              

 

             Future Scheduled 2023   Screening for              [facility]



             Test         14:51:14     malignant neoplasm of              



                                       colon (procedure)              



                                       [code = 131462661]              

 

             Future Scheduled 2023   SHINGLES VACCINES (1              Met

[facility]



             Test         14:51:14     of 2) [code =              



                                       SHINGLES VACCINES (1              



                                       of 2)]                    

 

             Future Scheduled 2023   BREAST CANCER              [facility]



             Test         14:51:14     SCREENING [code =              



                                       BREAST CANCER              



                                       SCREENING]                

 

             Future Scheduled 2023   65+ PNEUMOCOCCAL              [facility]



             Test         14:51:14     VACCINE (2 - PCV)              



                                       [code = 65+               



                                       PNEUMOCOCCAL VACCINE              



                                       (2 - PCV)]                

 

             Future Scheduled 2023   COVID-19 VACCINE (4 -              [facility]



             Test         14:51:14     Moderna series) [code              



                                       = COVID-19 VACCINE (4              



                                       - Moderna series)]              

 

             Future Scheduled 2023   INFLUENZA VACCINE              Method

[facility]



             Test         14:51:14     [code = INFLUENZA              



                                       VACCINE]                  

 

             Future Scheduled 2023   COVID-19 Vaccination              Uni

[facility]



             Test         09:46:41     (3 - Moderna series)              MD [facility]

[facility]



                                       [code = COVID-19              Center



                                       Vaccination (3 -              



                                       Moderna series)]              







Encounters







        Start   End     Encounter Admission Attending Care    Care    Encounter 

Source



        Date/Time Date/Time Type    Type    Clinicians Facility Department ID   

   

 

        2020-10-13         Outpatient         SYSTEM, [facility]     [id number]

 MD



        12:07:53                         PROVIDER                         Manolo mckenzie

 

        2023 Procedure                 1.2.840.1 [id number] 

[id number] Method[facility]



        12:30:00 09:44:28 visit                   43489.1.1         [address]



                                                3.430.2.7                 Hospit

a



                                                .3.482234                 l



                                                .8                      

 

        2023 Office          Fly 1.2.840.1 [id number] 

[id number] Methodi



        13:30:00 08:37:17 Visit           Pranay   31408.1.1         [address]



                                        Joel  3.430.2.7                 Hospit

a



                                                .3.280558                 l



                                                .8                      

 

        2023 Outpatient                 [facility]     [id number]

[address]



        00:00:00 00:00:00                                         [address]     Method

i



                                                                        st

 

        2023 Outpatient         FLY, [facility]     [id number]

[address]



        00:00:00 00:00:00                 GARTH                   234     [address]

i



                                                                        st

 

        2023 Travel                  1.2.840.1 1.2.073.919 [id number]

[id number] Methodi



        00:00:00 00:00:00                         56054.1.1 350.1.13.43 221     

st



                                                3.430.2.7 0.2.7.3.698         Ho

spita



                                                .3.443757 084.8           l



                                                .8                      

 

        2023 CAV             Soheila 2.16.840. 2.16.840.1. [facility]

[id number] [facility]



        21:00:00 21:30:00 Revisit:         Edgar  1.271341. 936140.4.6. J22    

 Medical



                        Gap                     4.5.12052 [id number]         



                        Closure &                 92753                   



                        Clinical                                         



                        Check-in                                         

 

        2023 Orders          Neftali,  1.2.840.1 [id number] 

[id number] Method[facility]



        00:00:00 00:00:00 Only            Elle   45040.1.1         [address]



                                                3.430.2.7                 Hospit

a



                                                .3.293957                 l



                                                .8                      

 

        2023 CAV             Tomora  2.16.840. 2.16.840.1. [facility]

[id number] [facility]



        21:00:00 22:00:00                 Muñoz   1.655813. 542783.4.6. 6E2     

Medical



                                                4.6.33066 [id number]         



                                                17490                   

 

        2022 Office          Nicolle, 1.2.840.1 [id number]

18863 Methodi



        10:00:00 10:52:49 Visit           Carina  28337.1.1         471     [facility] 3.430.2.7                 Hospit

a



                                                .3.724620                 l



                                                .8                      

 

        2022 Outpatient                 [facility]     [id number]

[address]



        00:00:00 00:00:00                                         471     Method

i



                                                                        st

 

        2022 CAV             Vonja   2.16.840. 2.16.840.1. [facility]

[id number] [facility]



        20:00:00 21:00:00                 Bertin 1.441044. 323670.4.6. Y9U    

 Medical



                                                4.6.57682 [id number]         



                                                63124                   

 

        2022 Outpatient         [facility]     DMG     [id number]

- [facility]



        00:00:00 00:00:00                                         0826    Medica

l



                                                                        Group

 

        2022 Outpatient         [facility]     DMG     [id number]

- [facility]



        00:00:00 00:00:00                                         0506    Medica

l



                                                                        Group

 

        2022-07-15 2022-07-15 Outpatient         [facility]     DMG     [id number]

022 [facility]



        06:20:00 06:20:00                                         0715    Medica

l



                                                                        Group

 

        2022 Outpatient         [facility]     [id number]

[address]



        00:00:00 00:00:00                 PROVIDER                 [address]



                                                                        st

 

        2022 [facility]         [facility]     [id number]

[address]



        00:00:00 00:00:00                 RITA                  606     Method

i



                                                                        st

 

        2022 Outpatient         CARINA [facility]     [id number]

[address]



        00:00:00 00:00:00                 RITA                  351     Method

i



                                                                        st

 

        2022 ATIYA Frankora  2.16.840. 2.16.840.1. [facility]

[id number] [facility]



        22:00:00 23:00:00                 Muñoz   1.787179. 523768.4.6. [facility]



                                                4.6.00615 [id number]         



                                                47298                   

 

        2022 Outpatient         [facility]     DM     [id number]

022 [facility]



        01:48:00 01:48:00                                         0127    Medica

l



                                                                        Group

 

        2021 Outpatient         [facility]     DM     [id number]

021 [facility]



        03:31:00 03:31:00                                         1227    Medica

l



                                                                        Group

 

        2021 Outpatient EL      TIBBELS, MDA     MDA     [id number]

8105 MD



        09:08:15 10:11:08                 DERRICK mckenzie

 

        2021-12-10 2021-12-10 Outpatient EL      TIBBELS, MDA     MDA     [id number]

9854 MD



        08:52:42 23:59:00                 DERRICK mckenzie

 

        2021-12-10 2021-12-10 Outpatient EL      TIBBELS, MDA     MDA     [id number]

9857 MD



        10:38:21 11:09:24                 DERRICK mckenzie

 

        2021-12-10 2021-12-10 Outpatient EL      TIBBELS, MDA     MDA     [id number]

9855 MD



        09:33:34 09:33:34                 DERRICK mckenzie

 

        2021-10-19 2021-10-19 Outpatient         CECILIA, [facility]     [id number]

[address]



        00:00:00 00:00:00                 CRISTOFER                 690     Method

i



                                                                        st

 

        2021 Outpatient         DAVID DM     DM     [id number]

84- [facility]



        04:19:00 04:19:00                 _N                      0421    Medica

l



                                                                        Group

 

        2021 Outpatient         CARINA, [facility]     [id number]

[address] [place] [address]



        00:00:00 00:00:00                 RITA                  333     Method

i



                                                                        st

 

        2020-10-09 2020-10-12 Outpatient EL      KORY, MDA     MDA     [id number]

7883 MD



        12:26:18 06:56:17                 JASON mckenzie

 

        2020-10-06 2020-10-06 Outpatient MABLE DUDLEY     MDA     [id number]

1139 MD



        00:00:00 00:00:00                 JASON mckenzie

 

        2020-10-06 2020-10-06 Outpatient MABLE COBIAN     MDA     [id number]

86182 MD



        00:00:00 00:00:00                 SIMA mckenzie







Results







           Test Description Test Time  Test Comments Results    Result Comments 

Source









                    Cancer antigen 15-3 2023 21:53:00 









                      Test Item  Value      Reference Range Interpretation Comme

nts









             CA 15-3 (test code 6.66 U/mL    <=38.00                   The above

 results were obtained



             = 1005)                                             with the Roche 

Opal



                                                                 Analyzer(ECLIA)

.Tumor marker



                                                                 results obtaine

d may be vary due



                                                                 to differences 

in assay methods



                                                                 and reagent spe

cificity.

 

             ROSALBA (test code = Items were attached to                           



             ROSALBA)         this order: xgld                           



[id number]43[facility]00





             Test Item    Value        Reference Range Interpretation Comments

 

             Hemolysis (test code <15          <=15                      



             = 3440)                                             

 

             Sodium (test code = 145 mmol/L   135-145                   



             4519829)                                            

 

             Potassium (test code 3.7 mmol/L   3.5-5.0                   



             = 5464067)                                          

 

             Chloride (test code = 104 mmol/L                       



             -0)                                             

 

             CO2 (test code = 29 mmol/L    23-32                     



             2028)                                             

 

             Glucose (test code = 103 mg/dL           H            



             5-7)                                             

 

             BUN (test code = 16 mg/dL     7-17                      



             8950053)                                            

 

             Creatinine (test code 0.7 mg/dL    0.6-1.0                   



             = 3676874)                                          

 

             eGFR MDRD (test code 77.7         See_Comment               If Afri

can



             = 8846)                                             American, multi

ply



                                                                 the GFR by 1.21

0.



                                                                 [Automated



                                                                 message] The



                                                                 system which



                                                                 generated this



                                                                 result transmit

cheikh



                                                                 reference range

:



                                                                 60.0 - 137.0



                                                                 mL/min/1.73m^2.



                                                                 The reference



                                                                 range was not u

sed



                                                                 to interpret th

is



                                                                 result as



                                                                 normal/abnormal

.

 

             Total bilirubin (test 0.3 mg/dL    0.2-1.3                   



             code = 1975-2)                                        

 

             Alkaline phosphatase 43 U/L                           



             (test code = 6768-6)                                        

 

             Protein (test code = 6.6 g/dL     6.0-8.5                   



             2885-2)                                             

 

             Albumin (test code = 4.0 g/dL     3.5-5.0                   



             7715015)                                            

 

             Calcium (test code = 9.7 mg/dL    8.4-10.6                  



             4535675)                                            

 

             AST (test code = 18 U/L       8-35                      



             1920-8)                                             

 

             ALT (test code = 27 U/L       7-44                      



             1742-6)                                             

 

             Anion gap (test code 16.0         5.0-17.0                  



             = 1783497)                                          

 

             BUN/creatinine ratio 22.0         7.0-25.0                  



             (test code = 3097-3)                                        

 

             ROSALBA (test code = ROSALBA) Items were                             



                          attached to                            



                          this order:                            



                          xgld                                   

 

             Lab Interpretation Abnormal                               



             (test code = 60592-4)                                        



[facility] with platelet and [id number] 21:11:00





             Test Item    Value        Reference Range Interpretation Comments

 

             WBC (test code = 6690-2) 12.3 10^3/uL 4.0-10.6     H            

 

             RBC (test code = 9577676) 4.19 10^6/uL 4.15-4.90                 

 

             HGB (test code = 280) 13.2 g/dL    12.0-16.0                 

 

             HCT (test code = 8964484) 41.0 %       37.0-48.0                 

 

             MCV (test code = 2077006) 97.9 fL      80.0-98.0                 

 

             MCH (test code = 5955354) 31.5 pg      28.0-33.0                 

 

             MCHC (test code = 6892847) 32.2 g/dL    32.0-36.0                 

 

             RDW (test code = 2973) 14.3 %       10.6-15.4                 

 

             Platelet count (test code 298 10^3/uL  150-400                   



             = 777-3)                                            

 

             MPV (test code = 6582880) 9.5 fL       9.4-12.4                  

 

             Nucleated RBC (test code = 0.0 %        0.0-1.0                   



             254)                                                

 

             Absolute nRBC (test code = 0.0 10^3/uL  0.0-0.0                   



             9688071)                                            

 

             Neutrophils (test code = 71.1 %       45.0-74.0                 



             9103337)                                            

 

             Lymphocytes (test code = 20.7 %       16.0-45.0                 



             736-9)                                              

 

             Monocytes (test code = 7.1 %        4.0-10.0                  



             5905-5)                                             

 

             Eosinophils (test code = 0.3 %        0.0-5.0                   



             1352)                                               

 

             Basophils (test code = 0.2 %        0.0-2.0                   



             706-2)                                              

 

             Immature granulocytes 0.6 %        0.0-1.0                   



             (test code = 1594)                                        

 

             Neutrophils, absolute 8.7 10^3/uL  1.6-9.0                   



             (test code = 1801)                                        

 

             Lymphocytes, absolute 2.5 10^3/uL  0.4-4.4                   



             (test code = 4672815)                                        

 

             Monocytes, absolute (test 0.9 10^3/uL  0.2-1.7                   



             code = 9558866)                                        

 

             Eosinophils, absolute 0.0 10^3/uL  0.0-1.7                   



             (test code = 1353)                                        

 

             Basophils, absolute (test 0.0 10^3/uL  0.0-0.3                   



             code = 929)                                         

 

             Immature granulocytes, 0.1 10^3/uL  0.0-0.0      H            



             absolute (test code =                                        



             2839)                                               

 

             ROSALBA (test code = ROSALBA) Items were attached                          

 



                          to this order: xgld                           

 

             Lab Interpretation (test Abnormal                               



             code = 56363-2)                                        



[facility], with reflex to culture
2022 15:12:00





             Test Item    Value        Reference Range Interpretation Comments

 

             Specific gravity, 1.007        1.005-1.030               



             urine (test code =                                        



             5811-5)                                             

 

             pH, urine (test code 7.5          5.0-7.5                   



             = 5803-2)                                           

 

             Color, UA (test code Yellow       Yellow                    



             = 5778-6)                                           

 

             Appearance (test code Clear        Clear                     



             = 5767-9)                                           

 

             WBC esterase, urine Trace        Negative     A            



             (test code = 5799-2)                                        

 

             Protein, UA (test Negative     Negative/Trace              



             code = 45322-5)                                        

 

             Glucose, urine (test Negative     Negative                  



             code = 73722-7)                                        

 

             Ketones, UA (test Negative     Negative                  



             code = 1024-8)                                        

 

             Occult blood, urine Negative     Negative                  



             (test code = 5794-3)                                        

 

             Bilirubin, UA (test Negative     Negative                  



             code = 5770-3)                                        

 

             Urobilinogen, UA 0.2 mg/dL    0.2-1.0                   



             (test code = 13172-5)                                        

 

             Nitrite, UA (test Negative     Negative                  



             code = 5802-4)                                        

 

             Microscopic  See below:                             Microscopic was



             examination (test                                        indicated 

and was



             code = 09062-9)                                        performed.

 

             Urinalysis reflex Comment                                This speci

men has



             (test code = 2386)                                        reflexed 

to a



                                                                 Urine Culture.

 

             [facility] (test code = [facility]) Performed at:  [address] [id number]                           



                          Director: Reece Joseph MD, Phone:                           



                          [phone]                             

 

             Lab Interpretation Abnormal                               



             (test code = 34787-4)                                        



[facility] [id number] 15:12:00





             Test Item    Value        Reference Range Interpretation Comments

 

             WBC, UA (test code 0-5          See_Comment                [Automat

ed



             = 5821-4)                                           message] The



                                                                 system which



                                                                 generated this



                                                                 result transmit

cheikh



                                                                 reference range

: 0



                                                                 - 5 /hpf. The



                                                                 reference range



                                                                 was not used to



                                                                 interpret this



                                                                 result as



                                                                 normal/abnormal

.

 

             RBC, UA (test code None seen    See_Comment                [Automat

ed



             = 37005-5)                                          message] The



                                                                 system which



                                                                 generated this



                                                                 result transmit

cheikh



                                                                 reference range

: 0



                                                                 - 2 /hpf. The



                                                                 reference range



                                                                 was not used to



                                                                 interpret this



                                                                 result as



                                                                 normal/abnormal

.

 

             Epithelial cells None seen    See_Comment                [Automated



             (non renal) (test                                        message] T

he



             code = 5787-7)                                        system which



                                                                 generated this



                                                                 result transmit

cheikh



                                                                 reference range

: 0



                                                                 - 10 /hpf. The



                                                                 reference range



                                                                 was not used to



                                                                 interpret this



                                                                 result as



                                                                 normal/abnormal

.

 

             Casts (test code = None seen    None seen /lpf              



             53659-4)                                            

 

             Bacteria, UA (test None seen    None seen/Few              



             code = 5769-5)                                        

 

             [facility] (test code = Performed at:  -                           



             [facility])         [address] [id number]                           



                          Director: Reece Joseph MD, Phone:                           



                          [phone]                             



[facility], [id number] 15:12:00Urine 
cultureMore than 3 organisms recovered, none predominant. Please submitanother 
culture if clinically indicated.Greater than 100,000 colony forming units per mL
[facility] urinalysis [id number] 15:19:48





             Test Item    Value        Reference Range Interpretation Comments

 

             Color urine, POC (test Dark Yellow                            



             code = 6753778)                                        

 

             Clarity urine, POC Clear                                  



             (test code = 4931738)                                        

 

             Glucose urine, POC Negative     Negative                  



             (test code = 9236380)                                        

 

             Bilirubin urine, POC Negative     Negative                  



             (test code = 3760672)                                        

 

             Ketones urine, POC Negative     Negative                  



             (test code = 8349034)                                        

 

             Specific gravity urine, 1.010        1.005-1.030               



             POC (test code =                                        



             6597890)                                            

 

             Blood urine, POC (test Negative     Negative                  



             code = 8652073)                                        

 

             pH urine, POC (test 7.0          See_Comment                [Automa

cheikh



             code = 1123226)                                        message] The

 system



                                                                 which generated



                                                                 this result



                                                                 transmitted



                                                                 reference range

:



                                                                 5.0, 5.5, 6.0, 

6.5,



                                                                 7.0, 7.5, 8.0, 

8.5.



                                                                 The reference r

carrington



                                                                 was not used to



                                                                 interpret this



                                                                 result as



                                                                 normal/abnormal

.

 

             Protein urine, POC Negative     Negative                  



             (test code = 0587502)                                        

 

             Urobilinogen urine, POC <2.0         <=2.0                     



             (test code = 5275647)                                        

 

             Nitrite urine, POC Negative     Negative                  



             (test code = 4207916)                                        

 

             Leukocyte esterase Trace        Negative     A            



             urine, POC (test code =                                        



             7467693)                                            

 

             Lab Interpretation Abnormal                               



             (test code = 63039-3)                                        



[facility] BLADDER SCAN/[id number] 15:19:08





             Test Item    Value        Reference Range Interpretation Comments

 

             PVR volume (test code = 5766) 56 ml                                

  



[facility] [id number] 15:35:00Surgical Pathology Report Case: 
[id number]  Authorizing Provider: Aurelio Burrell MD Collected: 2017 
1310 Ordering Location: [facility] Received: 2017 1515 
Pathologist: Sophie Roe MD Specimen: Breast, Right  BREAST, RIGHT, 
UPPER OUTER QUADRANT CALCIFICATIONS, STEREOTACTIC CORE BIOPSY: - DENSE STROMAL 
FIBROSIS - FAT NECROSIS - FOREIGN BODY GRANULOMATOUS REACTION - HEMOSIDERIN-
LADEN MACROPHAGES - DYSTROPHIC CALCIFICATIONSCG/pl Electronically signed by 
Sophie Roe MD on 2017 at 3:35 PMIn the sections examined, no 
malignancy is identified.[id number] upper outer breast calcifications likely fat
necrosisRight upper outer breast calcifications biopsyReceived in formalin 
labeled with the patient's information and "right upper outer breast 
calcifications" are multiple gray-white to yellow-tan cores of soft tissue 
ranging in length from 1.5 cm to 2.5 cm. Specimen is inked black and entirely 
submitted in cassettes A1-A2. DB/ewPerformed.

## 2023-07-27 LAB
BUN BLD-MCNC: 18 MG/DL (ref 7–18)
GLUCOSE SERPLBLD-MCNC: 148 MG/DL (ref 74–106)
HCT VFR BLD CALC: 43 % (ref 36–45)
LYMPHOCYTES # SPEC AUTO: 2.5 K/UL (ref 0.7–4.9)
MAGNESIUM SERPL-MCNC: 2.5 MG/DL (ref 1.6–2.4)
MCV RBC: 95.3 FL (ref 80–100)
PMV BLD: 6.9 FL (ref 7.6–11.3)
POTASSIUM SERPL-SCNC: 3.6 MEQ/L (ref 3.5–5.1)
RBC # BLD: 4.51 M/UL (ref 3.86–4.86)

## 2023-07-27 PROCEDURE — B2111ZZ FLUOROSCOPY OF MULTIPLE CORONARY ARTERIES USING LOW OSMOLAR CONTRAST: ICD-10-PCS

## 2023-07-27 PROCEDURE — 4A023N7 MEASUREMENT OF CARDIAC SAMPLING AND PRESSURE, LEFT HEART, PERCUTANEOUS APPROACH: ICD-10-PCS

## 2023-07-27 RX ADMIN — ENOXAPARIN SODIUM SCH MG: 100 INJECTION SUBCUTANEOUS at 08:28

## 2023-07-27 RX ADMIN — Medication SCH ML: at 08:28

## 2023-07-27 RX ADMIN — ACETAMINOPHEN PRN MG: 500 TABLET, FILM COATED ORAL at 08:34

## 2023-07-27 RX ADMIN — METOPROLOL TARTRATE SCH: 25 TABLET ORAL at 18:00

## 2023-07-27 RX ADMIN — METOPROLOL TARTRATE SCH: 25 TABLET ORAL at 05:45

## 2023-07-27 RX ADMIN — ENOXAPARIN SODIUM SCH: 100 INJECTION SUBCUTANEOUS at 20:02

## 2023-07-27 RX ADMIN — Medication SCH ML: at 20:13

## 2023-07-27 NOTE — RAD REPORT
EXAM DESCRIPTION:  MRI - Brain Wo Cont - 7/27/2023 1:50 pm

 

CLINICAL HISTORY:  new onset Dizziness, unsteady gait

 

COMPARISON:  No comparisons

 

TECHNIQUE:  Sagittal T1-weighted images were obtained along with PD/heavily T2-weighted and T2-FLAIR 
images. Axial DWI and ADC mapping sequences were also obtained along with coronal heavily T2-weighted
 images were obtained.

 

FINDINGS:  No intracranial hemorrhage, mass or acute infarction. There is no edema or shift of midlin
e structures. No extra-axial fluid collections. Signal voids are seen as a normal finding in the jeet
r intracranial vessels. Mild chronic small vessel ischemic changes . Age-appropriate cerebral atrophy
. Possible suprasellar lesion measuring approximately 13 x 9 mm. This is incompletely assessed withou
t contrast .

 

 

Mastoid air cells and paranasal sinuses are clear.

 

IMPRESSION:  Possible suprasellar lesion. Recommend pituitary protocol MRI with and without contrast.
 No acute process identified.

## 2023-07-27 NOTE — EKG
Test Date:    2023-07-26               Test Time:    17:46:31

Technician:   CIRO                                    

                                                     

MEASUREMENT RESULTS:                                       

Intervals:                                           

Rate:         153                                    

CT:                                                  

QRSD:         108                                    

QT:           308                                    

QTc:          491                                    

Axis:                                                

P:                                                   

CT:                                                  

QRS:          -58                                    

T:            121                                    

                                                     

INTERPRETIVE STATEMENTS:                                       

                                                     

Atrial fibrillation

Left anterior fascicular block

Marked ST abnormality, possible inferior subendocardial injury

Abnormal ECG

No previous ECG available for comparison



Electronically Signed On 07-27-23 13:19:24 CDT by Kb Roger

## 2023-07-28 VITALS — SYSTOLIC BLOOD PRESSURE: 136 MMHG | TEMPERATURE: 97 F | DIASTOLIC BLOOD PRESSURE: 63 MMHG

## 2023-07-28 VITALS — OXYGEN SATURATION: 96 %

## 2023-07-28 LAB
BUN BLD-MCNC: 18 MG/DL (ref 7–18)
GLUCOSE SERPLBLD-MCNC: 116 MG/DL (ref 74–106)
HCT VFR BLD CALC: 38.7 % (ref 36–45)
LYMPHOCYTES # SPEC AUTO: 1.7 K/UL (ref 0.7–4.9)
MAGNESIUM SERPL-MCNC: 2.4 MG/DL (ref 1.6–2.4)
MCV RBC: 95 FL (ref 80–100)
PMV BLD: 7.1 FL (ref 7.6–11.3)
POTASSIUM SERPL-SCNC: 3.8 MEQ/L (ref 3.5–5.1)
RBC # BLD: 4.08 M/UL (ref 3.86–4.86)
TSH SERPL DL<=0.05 MIU/L-ACNC: 1.78 UIU/ML (ref 0.36–3.74)

## 2023-07-28 RX ADMIN — METOPROLOL TARTRATE SCH MG: 25 TABLET ORAL at 06:22

## 2023-07-28 RX ADMIN — ENOXAPARIN SODIUM SCH MG: 100 INJECTION SUBCUTANEOUS at 07:59

## 2023-07-28 RX ADMIN — ACETAMINOPHEN PRN MG: 500 TABLET, FILM COATED ORAL at 04:45

## 2023-07-28 RX ADMIN — ACETAMINOPHEN PRN MG: 500 TABLET, FILM COATED ORAL at 11:03

## 2023-07-28 RX ADMIN — Medication SCH ML: at 08:00

## 2023-07-28 NOTE — PN
Date of Progress Note:  07/28/2023



Subjective:  Seen by bedside, doing clinically well.  Coronary angiogram did not reveal any significa
nt coronary artery disease.



Review of Systems:

No chest pain, shortness of breath, orthopnea, or cough.  No nausea, vomiting, or diarrhea.  All othe
r systems reviewed, they were negative.



Objective:  Vital Signs:  Reviewed. 

Head and Neck:  Pupils are equal, reactive to light.  Intact eye movements.  No JVD.  No cervical lym
phadenopathy.  Neck is supple.  Thyroid is not enlarged. 

Lungs:  Clear to auscultation bilaterally.  No rhonchi, wheezing, or crackles.  No accessory muscle u
se. 

Heart:  Regular rate and rhythm.  No extra sounds. 

Abdomen:  Soft, nontender.  Bowel sounds positive.  No organomegaly.  No masses or hernia.  No rigidi
ty or rebound. 

Extremities:  No edema, clubbing, or cyanosis.  Intact pulses. 

Skin:  No rashes.  No nodules. 

Neurologic:  Alert, awake, oriented x3.  No acute focal deficits appreciated.



Investigations:  Coronary angiogram:  No significant coronary artery disease.



Assessment And Recommendations:  

1.Elevated troponin.  This is demand ischemia.  Coronary angiogram is normal.  Patient was reassured
.

2.Atrial fibrillation, rate is controlled now.  Continue current management including Eliquis.  Foll
ow up with primary cardiologist 

as an outpatient.

3.Elevated LVEDP.  Low-salt diet and low-dose Lasix on discharge.





SR/MODL

DD:  07/28/2023 15:09:23Voice ID:  [id number]

DT:  07/28/2023 17:52:53Report ID:  [id number]

## 2023-07-28 NOTE — CON
Date of Consultation:  07/27/2023



Reason For Consultation:  Elevated troponin, palpitations.



History Of Present Illness:  A 73-year-old female, history of breast cancer, history of kidney stone,
 presented to emergency room with palpitation.  She said that it started all of a sudden.  She gets a
nxious with it.  She was evaluated in the Urgent Care and was referred to the ER for evaluation of at
rial fibrillation.  Denies having any symptoms, but she does have some chest pain on activities somet
imes and has shortness of breath on exertion, but currently is asymptomatic.  She apparently had a rh
ythm strip in the emergency room showing atrial fibrillation with rapid ventricular response.



Past Medical History:  As outlined above in the HPI.



Medications:  Refer consult sheet for detailed list.



Allergies:  CODEINE.



Family History:  No premature coronary artery disease or cancer.



Social History:  She does not smoke or drink.  Does not use any drugs.



Review of Systems:

All systems reviewed are negative except as mentioned in HPI.



Physical Examination:

Vital Signs:  Reviewed. 

Head and Neck:  Pupils are equal, reactive to light.  Intact eye movements.  No JVD.  No cervical lym
phadenopathy. 

Neck:  Supple.  Thyroid is not enlarged. 

Lungs:  Clear to auscultation bilaterally.  No rhonchi, wheezing, or crackles.  No accessory muscle u
se. 

Heart:  Regular rate and rhythm.  No extra sounds. 

Abdomen:  Soft, nontender.  Bowel sounds positive.  No organomegaly.  No masses or hernia.  No rigidi
ty or rebound. 

Extremities:  Trace edema bilaterally.  No clubbing or cyanosis.  Intact pulses. 

Skin:  No rash was noted. 

Neuro:  Alert, awake, oriented x3.  No acute focal deficits appreciated.



Investigations:  BUN is 18, creatinine 0.88.  Troponin peaked at 557.  Hemoglobin is 14.3.



Assessment And Recommendations:  

1.Atrial fibrillation with rapid ventricular response.  She converted to sinus rhythm, currently on 
beta blocker and she appears to be doing well.  If she goes back into atrial fibrillation, I recommen
d sotalol 80 mg twice a day and also to place the patient on anticoagulant with Eliquis 5 mg twice a 
day and discontinue Lovenox.

2.Elevated troponin.  Could be demand ischemia.  So we did a coronary angiogram today.  She does not
 have any significant coronary artery disease.  Discontinue Lovenox, put her on Eliquis and beta-bloc
ker.  Once she is stable on that, then she can be released.

3.Elevated LVEDP on echo.  I recommend gentle diuresis with Lasix 20 mg daily by mouth and to be fol
lowed up as an outpatient very closely.  The patient has a cardiologist, which she made appointment t
o see post discharge.





SR/MODL

DD:  07/27/2023 18:32:16Voice ID:  [id number]

DT:  07/28/2023 00:25:33Report ID:  [id number]

## 2023-07-28 NOTE — OP
Date of Procedure:  07/27/2023



Surgeon:  ROBERT LEMONS



Procedure Performed:  

1.Selective coronary angiogram.

2.Left heart catheterization.



Indication:  Elevated troponin.



Access:  Right radial artery 6-Icelandic closed with TR band.



Complications:  None.



Bleeding:  Less than 20 mL.



Anesthesia:  Total sedation time was 30 minutes.



Description Of Procedure:  After risks, benefits, alternatives were explained, the patient agreed to 
proceed and signed informed consent.  The patient was brought into the cardiac catheterization labora
tory, prepped and draped in the usual sterile fashion.  Then, I accessed right radial artery using pe
diatric micropuncture kit, placed 6-Icelandic sheath through the sheath and took 5-Icelandic Tiger 4.0 cath
eter into the aortic root, engaged the left main and then the right coronary artery and took standard
 views and the catheter was pushed over the wire into the LV, measured LVEDP, pullback did not record
 any gradient.  I removed the catheter and sheath, placed TR band with good hemostasis.



Findings:  

1.Left main; large, normal.

2.LAD, very large, normal diagonal branches.

3.The left circumflex is very large and codominant and normal.

4.RCA, moderate size and codominant with luminal irregularities.

5.Elevated LVEDP at 20 mmHg.



Conclusion:  

1.No significant coronary artery disease.

2.Elevated LVEDP.



Recommendations:  Diuretics, likely she has diastolic heart failure.





SR/MODL

DD:  07/27/2023 17:44:34Voice ID:  [id number]

DT:  07/28/2023 02:44:30Report ID:  [id number]

## 2023-07-28 NOTE — EKG
Test Date:    2023-07-27               Test Time:    03:38:16

Technician:   ISABELL                                   

                                                     

MEASUREMENT RESULTS:                                       

Intervals:                                           

Rate:         55                                     

CT:           170                                    

QRSD:         110                                    

QT:           414                                    

QTc:          396                                    

Axis:                                                

P:            -21                                    

CT:           170                                    

QRS:          -57                                    

T:            -84                                    

                                                     

INTERPRETIVE STATEMENTS:                                       

                                                     

Sinus bradycardia

Left anterior fascicular block

Moderate voltage criteria for LVH, may be normal variant

Marked ST abnormality, possible inferior subendocardial injury

Abnormal ECG

Compared to ECG 07/26/2023 17:46:31

Left ventricular hypertrophy now present

Atrial fibrillation no longer present

ST (T wave) deviation still present



Electronically Signed On 07-28-23 13:27:20 CDT by Kb Roger